# Patient Record
(demographics unavailable — no encounter records)

---

## 2025-01-16 NOTE — DISCUSSION/SUMMARY
[FreeTextEntry1] : Currently stable from a cardiovascular standpoint. . Euvolemic. No ischemic or CHF symptoms. Continue current care.  Recent cardiovascular test results reviewed (echo, carotid duplex). Labs from November reviewed. Lipids acceptable at this time (chol 173, , HDL 39, trig 122).  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 Because of low blood pressure I discontinued his prostate medication, we will wean him off the beta-blocker, and also changed the Brilinta to Plavix daily, and reduce the statin to twice a week He will follow-up in 6 months. EKG today was normal. The patient is cleared to undergo colonoscopy. [EKG obtained to assist in diagnosis and management of assessed problem(s)] : EKG obtained to assist in diagnosis and management of assessed problem(s)

## 2025-01-16 NOTE — REASON FOR VISIT
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  He has recuperated very well and is back to functioning normally. On CT scan he had normal coronary arteries so I will reduce his statin to twice a week. He will discontinue the prostate medication as he does not have any problem. I will switch the Brilinta to Plavix daily with baby aspirin

## 2025-01-16 NOTE — CARDIOLOGY SUMMARY
[de-identified] : \par  01/20/23 - sinus bradycardia, 59 bpm, nonspecific ST abnormality\par   [de-identified] : \par  11/08/22 - mild MR, mod AR, mild diastolic dysfunction, LVEF 63%\par   [de-identified] : \par  11/04/22 (carotid duplex) - mild atherosclerotic disease

## 2025-03-20 NOTE — END OF VISIT
[FreeTextEntry3] :  I, JOSEPHINE Pagan , personally performed the evaluation and management (E/M) services for this established  patient. That E/M includes conducting the initial examination, assessing all conditions, and establishing the plan of care. Today, EMANI LOWE  was here to observe my evaluation and management services for this patient.

## 2025-03-20 NOTE — DATA REVIEWED
[FreeTextEntry1] : 2/24/25 CTA Chest revealed Status post replacement of aortic valve, aortic ascending aorta. Large periprosthetic heterogeneous fluid with high density component measuring 7.5 x 7.2 cm. normal heart size without pericardial effusion. Status post replacement of aortic valve, aortic root and ascending aorta,  Left aortic arch with two vessel branching pattern of the great vessels without stenosis. No dissection or intramural hematoma. Thoracic aorta luminal measurements:  Aorta luminal measurements:  *  Aortic root: 3.5 cm *  Mid ascending: 3 cm *  Transverse arch: 2.8 cm *  Mid descending: 3.2 cm *  Diaphragm level: 2.6 cm   3/28/24 TTE revealed 1. Left ventricular systolic function is preserved with an ejection fraction visually estimated at 50 to 55 %.  2. Basal inferior segment is abnormal.  3. Normal right ventricular cavity size, with normal wall thickness, and normal systolic function.  4. No pericardial effusion seen.  5. Compared to the transthoracic echocardiogram performed on 12/19/2023, s/p AVR.  6. Echogenic graft material noted in the proximal ascending aorta.

## 2025-03-20 NOTE — HISTORY OF PRESENT ILLNESS
[FreeTextEntry1] :  Mr. NETTLES is a 63 year old male with  past medical history of hypothyroidism, HLD, heart murmur, JIMBO in 2022 showed moderate aortic insufficiency, found to have a 5 cm aneurysm of the ascending aorta, 1/8/24 Right sinus of valsalva aneurysm, aortic root replacement with 23 mm konnect graft / CABG x 1 to RCA returned to OR, products given. discharge 1/16/24 Pt developed post op left thigh swelling at SVG site. Hematoma/seroma was drained in Dr. Keys's office x 2 but reoccurred. On 3/15 underwent evacuation of seroma placement of VAC. He is here for follow up visit.   Today he presents and reports that he is doing well. Denies any chest pain, shortness of breath, palpitations, dizziness or pedal edema.      NYHA class I

## 2025-03-20 NOTE — CONSULT LETTER
[Dear  ___] : Dear  [unfilled], [Courtesy Letter:] : I had the pleasure of seeing your patient, [unfilled], in my office today. [Please see my note below.] : Please see my note below. [Consult Closing:] : Thank you very much for allowing me to participate in the care of this patient.  If you have any questions, please do not hesitate to contact me. [Sincerely,] : Sincerely, [FreeTextEntry2] : Dr. Vladimir Medina [FreeTextEntry3] : Glen Keys MD  &   Cardiovascular & Thoracic Surgery [place] [phone]

## 2025-03-20 NOTE — ASSESSMENT
[FreeTextEntry1] :  Mr. NETTLES is a 63 year old male with  past medical history of hypothyroidism, HLD, heart murmur, JIMBO in 2022 showed moderate aortic insufficiency, found to have a 5 cm aneurysm of the ascending aorta, 1/8/24 Right sinus of valsalva aneurysm, aortic root replacement with 23 mm konnect graft / CABG x 1 to RCA returned to OR, products given. discharge 1/16/24 Pt developed post op left thigh swelling at SVG site. Hematoma/seroma was drained in Dr. Keys's office x 2 but reoccurred. On 3/15 underwent evacuation of seroma placement of VAC. He is here for follow up visit.   Today he presents and reports that he is doing well. Denies any chest pain, shortness of breath, palpitations, dizziness or pedal edema.   2/24/25 CTA Chest revealed Status post replacement of aortic valve, aortic ascending aorta. Large periprosthetic heterogeneous fluid with high density component measuring 7.5 x 7.2 cm. normal heart size without pericardial effusion. Status post replacement of aortic valve, aortic root and ascending aorta,  Left aortic arch with two vessel branching pattern of the great vessels without stenosis. No dissection or intramural hematoma. Thoracic aorta luminal measurements: Aorta luminal measurements: *  Aortic root: 3.5 cm *  Mid ascending: 3 cm *  Transverse arch: 2.8 cm *  Mid descending: 3.2 cm *  Diaphragm level: 2.6 cm  3/28/24 TTE revealed 1. Left ventricular systolic function is preserved with an ejection fraction visually estimated at 50 to 55 %.  2. Basal inferior segment is abnormal.  3. Normal right ventricular cavity size, with normal wall thickness, and normal systolic function.  4. No pericardial effusion seen.  5. Compared to the transthoracic echocardiogram performed on 12/19/2023, s/p AVR.  6. Echogenic graft material noted in the proximal ascending aorta.    The report was reviewed and noted in the chart, Dr. Keys independently reviewed and interpreted images and report.  The surgical repair is intact and stable.     Plan   1. Follow up in [facility] in [facility]  2. Continue medication regimen. 3. Follow up with cardiologist and PCP. 4. BP control- I have recommended the patient to monitor his blood pressure closely. I have also advised the patient to take daily blood pressures at home and adhere to medication regimen. 5. Discussed signs and symptoms that warrant emergency medical attention

## 2025-03-20 NOTE — PHYSICAL EXAM
[Sclera] : the sclera and conjunctiva were normal [PERRL With Normal Accommodation] : pupils were equal in size, round, and reactive to light [Neck Appearance] : the appearance of the neck was normal [] : no respiratory distress [Respiration, Rhythm And Depth] : normal respiratory rhythm and effort [Auscultation Breath Sounds / Voice Sounds] : lungs were clear to auscultation bilaterally [Apical Impulse] : the apical impulse was normal [Heart Rate And Rhythm] : heart rate was normal and rhythm regular [Heart Sounds] : normal S1 and S2 [Murmurs] : no murmurs [Examination Of The Chest] : the chest was normal in appearance [2+] : left 2+ [Breast Appearance] : normal in appearance [Bowel Sounds] : normal bowel sounds [Abdomen Soft] : soft [FreeTextEntry1] : Deferred  [No CVA Tenderness] : no ~M costovertebral angle tenderness [Abnormal Walk] : normal gait [Involuntary Movements] : no involuntary movements were seen [Skin Color & Pigmentation] : normal skin color and pigmentation [Skin Turgor] : normal skin turgor [No Focal Deficits] : no focal deficits [Oriented To Time, Place, And Person] : oriented to person, place, and time [Affect] : the affect was normal [Impaired Insight] : insight and judgment were intact [Mood] : the mood was normal [Memory Recent] : recent memory was not impaired [Memory Remote] : remote memory was not impaired

## 2025-03-20 NOTE — CONSULT LETTER
[Dear  ___] : Dear  [unfilled], [Courtesy Letter:] : I had the pleasure of seeing your patient, [unfilled], in my office today. [Please see my note below.] : Please see my note below. [Consult Closing:] : Thank you very much for allowing me to participate in the care of this patient.  If you have any questions, please do not hesitate to contact me. [Sincerely,] : Sincerely, [FreeTextEntry2] : Dr. Vladimir Medina [FreeTextEntry3] : Glen Keys MD  &   Cardiovascular & Thoracic Surgery [place] [phone]